# Patient Record
Sex: FEMALE | Race: WHITE | NOT HISPANIC OR LATINO | Employment: OTHER | URBAN - METROPOLITAN AREA
[De-identification: names, ages, dates, MRNs, and addresses within clinical notes are randomized per-mention and may not be internally consistent; named-entity substitution may affect disease eponyms.]

---

## 2018-01-16 ENCOUNTER — OFFICE VISIT (OUTPATIENT)
Dept: PODIATRY | Facility: CLINIC | Age: 78
End: 2018-01-16
Payer: MEDICARE

## 2018-01-16 ENCOUNTER — HOSPITAL ENCOUNTER (OUTPATIENT)
Dept: RADIOLOGY | Facility: HOSPITAL | Age: 78
Discharge: HOME | End: 2018-01-16
Attending: PODIATRIST
Payer: MEDICARE

## 2018-01-16 VITALS — WEIGHT: 156.75 LBS | SYSTOLIC BLOOD PRESSURE: 121 MMHG | DIASTOLIC BLOOD PRESSURE: 82 MMHG | HEART RATE: 77 BPM

## 2018-01-16 DIAGNOSIS — M79.672 BILATERAL FOOT PAIN: ICD-10-CM

## 2018-01-16 DIAGNOSIS — M79.671 BILATERAL FOOT PAIN: Primary | ICD-10-CM

## 2018-01-16 DIAGNOSIS — M72.2 PLANTAR FASCIITIS OF LEFT FOOT: ICD-10-CM

## 2018-01-16 DIAGNOSIS — M19.079 ARTHRITIS OF MIDFOOT: ICD-10-CM

## 2018-01-16 DIAGNOSIS — M79.672 ARCH PAIN OF LEFT FOOT: Primary | ICD-10-CM

## 2018-01-16 DIAGNOSIS — M21.6X1 ACQUIRED BILATERAL PES CAVUS: ICD-10-CM

## 2018-01-16 DIAGNOSIS — M21.6X2 ACQUIRED BILATERAL PES CAVUS: ICD-10-CM

## 2018-01-16 DIAGNOSIS — M79.671 BILATERAL FOOT PAIN: ICD-10-CM

## 2018-01-16 DIAGNOSIS — M79.672 BILATERAL FOOT PAIN: Primary | ICD-10-CM

## 2018-01-16 PROCEDURE — 99203 OFFICE O/P NEW LOW 30 MIN: CPT | Mod: S$PBB,,, | Performed by: PODIATRIST

## 2018-01-16 PROCEDURE — 99999 PR PBB SHADOW E&M-NEW PATIENT-LVL III: CPT | Mod: PBBFAC,,, | Performed by: PODIATRIST

## 2018-01-16 PROCEDURE — 73630 X-RAY EXAM OF FOOT: CPT | Mod: 26,50,, | Performed by: RADIOLOGY

## 2018-01-16 PROCEDURE — 73630 X-RAY EXAM OF FOOT: CPT | Mod: 50,TC,FY,PO

## 2018-01-16 PROCEDURE — 99203 OFFICE O/P NEW LOW 30 MIN: CPT | Mod: PBBFAC,25,PO | Performed by: PODIATRIST

## 2018-01-16 RX ORDER — ZINC GLUCONATE 50 MG
50 TABLET ORAL DAILY
COMMUNITY

## 2018-01-16 RX ORDER — ACETAMINOPHEN AND PHENYLEPHRINE HCL 325; 5 MG/1; MG/1
TABLET ORAL
COMMUNITY

## 2018-01-16 RX ORDER — CICLOPIROX 80 MG/ML
SOLUTION TOPICAL
Qty: 1 BOTTLE | Refills: 10 | Status: SHIPPED | OUTPATIENT
Start: 2018-01-16

## 2018-01-16 RX ORDER — ACETAMINOPHEN 500 MG
1 TABLET ORAL DAILY
COMMUNITY

## 2018-01-16 RX ORDER — SIMVASTATIN 20 MG/1
20 TABLET, FILM COATED ORAL NIGHTLY
COMMUNITY

## 2018-01-16 RX ORDER — MELOXICAM 7.5 MG/1
TABLET ORAL
Qty: 30 TABLET | Refills: 0 | Status: SHIPPED | OUTPATIENT
Start: 2018-01-16

## 2018-01-16 RX ORDER — VERAPAMIL HYDROCHLORIDE 180 MG/1
180 TABLET, FILM COATED, EXTENDED RELEASE ORAL NIGHTLY
COMMUNITY
End: 2018-01-25 | Stop reason: ALTCHOICE

## 2018-01-16 RX ORDER — MELATONIN 10 MG
CAPSULE ORAL
COMMUNITY

## 2018-01-16 NOTE — PROGRESS NOTES
PODIATRY CONSULTATION NOTE    CHIEF COMPLAINT  Chief Complaint   Patient presents with    Foot Pain     Bilateral foot pain. Left is worst than right. Throbbing arch pain that radiates. Present for over a year. Tried over the counter inserts with no relief       HPI  Sanjana West is a 77 y.o. female who complains of pain to both feet. She states pain is present at both arches and extends to top of her feet. She states LEFT is worse than RIGHT.  Pain has been present for several years.  Pain is worsening.  Pain is aggravated by weight bearing, walking.  Pain is improved by rest.  Previous treatments include: OTC inserts and Tylenol .      PMH  Past Medical History:   Diagnosis Date    Hyperlipidemia     Hypertension         PSH  History reviewed. No pertinent surgical history.     MEDS  No current outpatient prescriptions on file prior to visit.     No current facility-administered medications on file prior to visit.       Medication List with Changes/Refills   New Medications    CICLOPIROX (PENLAC) 8 % SOLN    Apply to affected toenails at night time DAILY. On 7th day, file nails down, clean all nails with alcohol and restart application process.    MELOXICAM (MOBIC) 7.5 MG TABLET    Take 1 pill daily for 14 days and then only take as needed for pain   Current Medications    BIOTIN 10,000 MCG CAP    Take by mouth.    CHOLECALCIFEROL, VITAMIN D3, (VITAMIN D3) 2,000 UNIT CAP    Take 1 capsule by mouth once daily.    CHROMIUM ORAL    Take 1,000 mg by mouth.    MELATONIN 10 MG CAP    Take by mouth.    POTASSIUM 99 MG TAB    Take by mouth once.    SIMVASTATIN (ZOCOR) 20 MG TABLET    Take 20 mg by mouth every evening.    TURMERIC ROOT EXTRACT ORAL    Take by mouth.    VERAPAMIL (CALAN-SR) 180 MG CR TABLET    Take 180 mg by mouth every evening.    ZINC GLUCONATE 50 MG TABLET    Take 50 mg by mouth once daily.       ALLG  Review of patient's allergies indicates:   Allergen Reactions    Sulfa (sulfonamide  antibiotics)        SOCIAL Hx  Social History     Social History    Marital status:      Spouse name: N/A    Number of children: N/A    Years of education: N/A     Social History Main Topics    Smoking status: Never Smoker    Smokeless tobacco: Never Used    Alcohol use None    Drug use: Unknown    Sexual activity: Not Asked     Other Topics Concern    None     Social History Narrative    None       FAM Hx  History reviewed. No pertinent family history.    Review of systems:  The patient denies nausea, vomiting, fevers, chills, shortness of breath, chest pain, and calf pain.      OBJECTIVE:  Vitals:    01/16/18 1417   BP: 121/82   Pulse: 77   Weight: 71.1 kg (156 lb 12 oz)   PainSc:   8   PainLoc: Foot       Lower Extremity Physical Exam    Vascular exam:   · Dorsalis pedis and posterior tibial pulses palpable 2/4 bilaterally.   · Capillary refill time immediate to the toes.   · Feet are warm to the touch.   · There are varicosities, telangiectasias noted to bilateral foot and ankle regions.   · There are no ecchymoses noted to bilateral foot and ankle regions.   · There is no gross lower extremity edema.    Dermatologic exam:   · Skin moist with healthy texture and turgor.  · There are no open ulcerations, lacerations, or fissures to bilateral foot and ankle regions. There are no signs of infection as there is no erythema, no proximal-extending lymphangiitis, no fluctuance, or crepitus noted on palpation to bilateral foot and ankle regions.   · There is no interdigital maceration.   · There are no hyperkeratotic lesions noted to feet. Nails are well-trimmed.    Neurologic exam:  ·  Epicritic sensation is intact as the patient is able to sense light touch to bilateral foot and ankle regions.     Musculoskeletal exam:   · There is pain with palpation medial band of plantar fascia, LEFT  · PES CAVUS foot type b/l   · No pain with medial-lateral compression of calcaneus.  · 5/5 muscle strength in all 4  quadrants.   · Ankle joint range of motion decreased with knee extended and flexed b/l.    Radiographic Evaluation: x-rays of the foot reviewed    ASSESSMENT:   Arch pain of left foot  -     Ambulatory Referral to Physical/Occupational Therapy    Plantar fasciitis of left foot    Arthritis of midfoot    Acquired bilateral pes cavus    Other orders  -     meloxicam (MOBIC) 7.5 MG tablet; Take 1 pill daily for 14 days and then only take as needed for pain  Dispense: 30 tablet; Refill: 0  -     ciclopirox (PENLAC) 8 % Soln; Apply to affected toenails at night time DAILY. On 7th day, file nails down, clean all nails with alcohol and restart application process.  Dispense: 1 Bottle; Refill: 10          PLAN:   1. The patient was examined and evaluated   2. Treatment options were discussed in detail; the patient elected to undergo conservative treatment. I Reviewed films with patient. Discussed different treatment options for heel pain.   3. I gave written and verbal instructions on heel cord stretching and this was demonstrated for the patient. A theraband was also provided to the patient for stretching exercises. Recommendations were given for plantar fasciitis treatment including icing, stretching, arch supports, avoidance of barefoot walking, appropriate shoe wear, and strict compliance. Discussed importance of patient to perform stretching exercises.   4. Prescription was written for OTC arch supports.  I Discussed that plantar fasciitis typically resolves on its own in a variable amount of time. If no improvement, will proceed with cortisone injection. I also  discussed possible tx with SLC, PT, Dynasplint and custom orthotics.Surgical intervention is the last resort for plantar fasciitis.     An Rx was written for MOBIC.    - A prescription for PENLAC was provided to patient for treatment of fungal toenail infection with written instructions on application. Refills were provided as a courtesy. Pt informed fungal  nail treatment topicals will take up to or over a year for improvement.     5. Information was given regarding the patient's condition and prognosis. All the patient's questions were answered.   6. Return to clinic in 4-6 weeks for follow up evaluation, or sooner if symptoms worsen.   7. The patient is welcome to call or email with any questions or concerns at any time.       Report Electronically Signed By:  Candelaria Pruitt DPM   Podiatric Medicine & Surgery  [facility]  1/16/2018

## 2018-01-16 NOTE — PATIENT INSTRUCTIONS
EUGENE OR PRISCILLA CRYSTAL    PENLAC ANTI-FUNGAL TOPICAL INSTRUCTIONS   1. You have been prescribed Penlac nail lacquer (Ciclopirox) topical solution 8%. Go and  the prescription from your local pharmacy.    2. Remove any nail polish on your feet. File away (with emery board) loose nail material and trim nails.    3. Apply the Penlac nail lacquer (ciclopirox) Topical Solution, 8% once daily (preferably at bedtime or eight hours before washing) to all affected nails with the applicator brush provided. The nail lacquer should be applied evenly over the entire nail plate. If possible, apply the solution to the nail bed, hyponychium, and the under surface of the nail plate when if your nail is loosely attached. Remember fungus lives under the nail plate, therefore the more the solution penetrates the nail bed, the better.    3. Continue to apply the solution daily (at bedtime preferably).  Daily applications should be made over the previous coat and removed with alcohol every seven days. This cycle should be repeated throughout the duration of therapy.     4. On the 7th day, remove the solution from all of your nails with alcohol. File your nails again with an emery board and then repeat the cycle again.    5. This treatment must be consistent. If you skip a day, continue the cycle as recommended. It may take up to 1 year for your nails to clear the fungal infection. Be patient.    It is recommended to dispose of all infected shoe gear that you will not likely wear again as well as weekly cleansing of all showering/bathing areas with antiseptics.Fungal spores like to hide in dark, warm, moist environments. Lastly, monthly treatments of all current shoe gear with moth balls x 8 hours.                  FUNGAL NAIL INFECTIONS    If your nail is thick and looks white or yellow, it may be infected with fungus. Nail   infections don't look pleasant, but they are not serious. There are treatments that   can clear up the  infection.     What is a fungal nail infection?   It's easy to catch a fungal nail infection, and lots of people get them. The sooner you   treat an infection, the easier it is to get rid of it. The fungi that cause these infections often live in warm, damp places, such as showers and floors around changing rooms.   People used to think there was nothing you could do about a fungal nail infection. But   there are now good treatments that can get rid of it. However, they take a long time to   work (as long as one year if the infection is bad). So you need to be patient.    Fungal infection of the nails causes changes in the appearance of fingernails and toenails. They may thicken, discolor, change shape or split. This condition is difficult to treat because nails grow slowly and have limited blood supply. It is common to have the infection come back after treatment.       What are the symptoms?   Your nail may look whitish or yellowish, and raised up from the finger or toe. The skin   around your nail may turn red. Sometimes the nail lifts off altogether. If the infection is   severe, the nail may also be crumbly. It's more common to get an infection of a toenail   than a fingernail. If you've had an infection for a long time, it may be painful. If you have a badly infected toe, it may be difficult to walk. If your immune system is weak or you have diabetes, you may be more likely to get these infections. The infection can also be more serious. So it's important to see your doctor as soon as possible if you think you have a nail infection.     What treatments work?   To get rid of a fungal nail infection you will probably have to take tablets, sometimes for   several months. There are also topical solutions (over the counter and prescription) that  you can put on your nail, and  things you can do to try to avoid getting another nail infection.    There are two types of medicines used.   Topical anti-fungal medicines  are applied to the surface of the skin and nail area. These medicines are not very effective because they cannot get deep into the nail.     Topical medicines are most useful in combination with oral medicines . Oral antifungal medicines are more effective because they penetrate the nail from the inside out.  If medicines fail, the nail can be removed surgically or chemically. This improves the effectiveness of medical treatment because the fungus is physically removed from the body.       Tablets   The tablets that doctors use most often are called itraconazole (brand name Sporanox)   and terbinafine (Lamisil). Terbinafine seems to work slightly better. If you take terbinafine every day for 12 weeks, there's a 6 in 10 chance you'll get rid of   your fungal toenail infection.      Alternative final options are:   If still no resolution with oral tablets or topics: then we can completely avulse/remove all involved toenails with subsequent treatment with topical antifungal solution.       Home Care:   1) Use medicines exactly as directed for as long as directed. Treating a fungal infection can take longer than other kinds of infections.   2) Smoking is a risk factor for fungal infection. This is one more reason to quit.   3) Wear absorbent socks and shoes that have ventilation. Sweaty feet increases risk of fungal infection and make an existing infection harder to treat.   4) Use footwear when in damp public places like swimming pools, gyms and shower rooms. This helps avoid exposure to the fungus that grows there.   It is recommended to dispose of all infected shoe gear that you will not likely wear again as well as weekly cleansing of all showering/bathing areas with antiseptics.Fungal spores like to hide in dark, warm, moist environments. Lastly, monthly treatments of all current shoe gear with moth balls x 8 hours.       Follow Up   with your doctor as directed by our staff.   Get Prompt Medical Attention   if any  of the following occur:   -- Skin alongside the nail becomes reddened, swollen, painful or drains pus   -- Side effects from oral anti-fungal medicines       © 3190-8173 [employer]. [address] [place] [address], [place] [address]. All rights reserved. This information is not intended as a substitute for professional medical care. Always follow your healthcare professional's instructions.

## 2018-01-25 ENCOUNTER — OFFICE VISIT (OUTPATIENT)
Dept: CARDIOLOGY | Facility: CLINIC | Age: 78
End: 2018-01-25
Payer: MEDICARE

## 2018-01-25 VITALS
BODY MASS INDEX: 32.22 KG/M2 | HEIGHT: 59 IN | HEART RATE: 74 BPM | SYSTOLIC BLOOD PRESSURE: 126 MMHG | WEIGHT: 159.81 LBS | DIASTOLIC BLOOD PRESSURE: 74 MMHG

## 2018-01-25 DIAGNOSIS — M79.89 LEG SWELLING: ICD-10-CM

## 2018-01-25 DIAGNOSIS — I10 ESSENTIAL HYPERTENSION: ICD-10-CM

## 2018-01-25 PROCEDURE — 99204 OFFICE O/P NEW MOD 45 MIN: CPT | Mod: S$PBB,,, | Performed by: INTERNAL MEDICINE

## 2018-01-25 PROCEDURE — 99213 OFFICE O/P EST LOW 20 MIN: CPT | Mod: PBBFAC,PO | Performed by: INTERNAL MEDICINE

## 2018-01-25 PROCEDURE — 99999 PR PBB SHADOW E&M-EST. PATIENT-LVL III: CPT | Mod: PBBFAC,,, | Performed by: INTERNAL MEDICINE

## 2018-01-25 RX ORDER — HYDROCHLOROTHIAZIDE 25 MG/1
25 TABLET ORAL DAILY
Qty: 30 TABLET | Refills: 11 | Status: SHIPPED | OUTPATIENT
Start: 2018-01-25 | End: 2019-01-27 | Stop reason: SDUPTHER

## 2018-01-25 NOTE — PROGRESS NOTES
Subjective:   Patient ID:  Sanjana West is a 78 y.o. female who presents for evaluation of Establish Care and Leg Swelling      79 yo female, came in for leg swelling. PCP is Dr. Harry at  clinic.  PMH HTn and HLD. Some OA.  Has been on Verapamil for a long time.  Left> right leg swelling for years, progressively worse, worse in PM but better in AM. No leg pain, numbness and weakness.  No CASTANEDA, chest pain, palpitation, orthopnea and PND.  Occasional dizziness. Regular gym exercise 3 times a week.  No smoking. Occasional drinking  ECHO in [facility] in : normaL EF, mild LVH and LAE, mild MR.         Past Medical History:   Diagnosis Date    Hyperlipidemia     Hypertension        History reviewed. No pertinent surgical history.    Social History   Substance Use Topics    Smoking status: Never Smoker    Smokeless tobacco: Never Used    Alcohol use Not on file       History reviewed. No pertinent family history.    Review of Systems   Constitution: Negative for decreased appetite, diaphoresis, fever, weakness, malaise/fatigue and night sweats.   HENT: Negative for nosebleeds.    Eyes: Negative for blurred vision and double vision.   Cardiovascular: Positive for leg swelling. Negative for chest pain, claudication, dyspnea on exertion, irregular heartbeat, near-syncope, orthopnea, palpitations, paroxysmal nocturnal dyspnea and syncope.   Respiratory: Negative for cough, shortness of breath, sleep disturbances due to breathing, snoring, sputum production and wheezing.    Endocrine: Negative for cold intolerance and polyuria.   Hematologic/Lymphatic: Does not bruise/bleed easily.   Skin: Negative for rash.   Musculoskeletal: Negative for back pain, falls, joint pain, joint swelling and neck pain.   Gastrointestinal: Negative for abdominal pain, heartburn, nausea and vomiting.   Genitourinary: Negative for dysuria, frequency and hematuria.   Neurological: Negative for difficulty with concentration,  dizziness, focal weakness, headaches, light-headedness, numbness and seizures.   Psychiatric/Behavioral: Negative for depression, memory loss and substance abuse. The patient does not have insomnia.    Allergic/Immunologic: Negative for HIV exposure and hives.       Objective:   Physical Exam   Constitutional: She is oriented to person, place, and time. She appears well-nourished.   HENT:   Head: Normocephalic.   Eyes: Pupils are equal, round, and reactive to light.   Neck: Normal carotid pulses and no JVD present. Carotid bruit is not present. No thyromegaly present.   Cardiovascular: Normal rate, regular rhythm, normal heart sounds and normal pulses.   No extrasystoles are present. PMI is not displaced.  Exam reveals no gallop and no S3.    No murmur heard.  Pulmonary/Chest: Breath sounds normal. No stridor. No respiratory distress.   Abdominal: Soft. Bowel sounds are normal. There is no tenderness. There is no rebound.   Musculoskeletal: Normal range of motion. She exhibits edema.   1+ LLE  trace RLL   Neurological: She is alert and oriented to person, place, and time.   Skin: Skin is intact. No rash noted.   Psychiatric: Her behavior is normal.       No results found for: CHOL  No results found for: HDL  No results found for: LDLCALC  No results found for: TRIG  No results found for: CHOLHDL    Chemistry    No results found for: NA, K, CL, CO2, BUN, CREATININE, GLU No results found for: CALCIUM, ALKPHOS, AST, ALT, BILITOT, ESTGFRAFRICA, EGFRNONAA       No results found for: TSH  No results found for: INR, PROTIME  No results found for: WBC, HGB, HCT, MCV, PLT  BNP  @LABRCNTIP(BNP,BNPTRIAGEBLO)@  CrCl cannot be calculated (No order found.).     Assessment:      1. Essential hypertension    2. Leg swelling        Plan:   D/c cardizem  Add HCTZ for HTN and leg swelling,  DASH

## 2018-01-26 ENCOUNTER — CLINICAL SUPPORT (OUTPATIENT)
Dept: REHABILITATION | Facility: HOSPITAL | Age: 78
End: 2018-01-26
Attending: PODIATRIST
Payer: MEDICARE

## 2018-01-26 DIAGNOSIS — M79.672 LEFT FOOT PAIN: Primary | ICD-10-CM

## 2018-01-26 PROCEDURE — 97161 PT EVAL LOW COMPLEX 20 MIN: CPT | Mod: PO | Performed by: PHYSICAL THERAPIST

## 2018-01-26 PROCEDURE — G8979 MOBILITY GOAL STATUS: HCPCS | Mod: CI,PO | Performed by: PHYSICAL THERAPIST

## 2018-01-26 PROCEDURE — G8978 MOBILITY CURRENT STATUS: HCPCS | Mod: CK,PO | Performed by: PHYSICAL THERAPIST

## 2018-01-26 PROCEDURE — 97140 MANUAL THERAPY 1/> REGIONS: CPT | Mod: PO | Performed by: PHYSICAL THERAPIST

## 2018-01-26 NOTE — PROGRESS NOTES
DATE OF INITIAL PHYSICAL THERAPY EVALUATION:             2018      REFERRING PROVIDER:       Candelaria Le DPM      REFERRING DIAGNOSIS:     Left foot pain   M79.62       ORDERS:   Evaluate and treat as indicated 1 x week x 60 days      Orders will  2018    CODING CYCLE VISIT    #1      SUBJECTIVE:      Patients primary complaint(s):  Tenderness and pain of the plantar fascia bilaterally; left worse than right  Painful gait    History of present condition:    Patient reports she began to experience tenderness and pain in the arches of both feet in 2017.  The left arch has been significantly more painful than the right.  She denies any trauma or unusual activity prior to onset of symptoms.  Prior to becoming symptomatic she was walking regularly on the treadmill for exercise.  She is no longer able to tolerate the treadmill; but has been able to continue exercising on the elliptical.   She states her pain wakes her during the night.  Pain is located in the plantar fascia bilaterally and described as throbbing in nature.  The first steps in the early AM are particularly painful.   She reports trying to stretch as suggested by her doctor; however she has tolerated these stretches poorly stating they are painful.  Stretch demonstrated to be ankle dorsiflexion combined with toe extension, and knee fully extended.    Pain Ratin/10  Left plantar fascia      4/10  Right plantar fasica    Patient reports the following functional activity limitations:  Gait impaired due to arch pain with weight bearing.    (FUNCTIONAL ASSESSMENT TOOL)  LOWER EXTREMITY FUNCTIONAL SCALE    Patient-reported functional assessment scores are rated as follows:  A score of 0/4 represents extreme difficulty or unable to perform the activity. A score of 1/4 represents quite a bit of difficulty. A score of 2/4 represents moderate difficulty. A score of 3/4 represents a little bit of difficulty. A score  of 4/4 represents no difficulty.  Completed on January 26, 2018                EVAL   1. Any of your usual work, housework or school activities   2/4  2. Your usual hobbies, sporting     1/4  3. Getting in and out of tub      3/4  4. Walking between rooms      3/4  5. Putting on shoes or socks      3/4  6. Squatting        4/4  7. Lifting an object from the ground      4/4  8. Performing light activities around the home   2/4  9. Performing heavy activities around the home   2/4  10. Getting in and out of car      4/4  11. Walking 2 blocks       1/4  12.Walking a mile       1/4  13. Getting up and down 1 flight of stairs    2/4  14. Standing for 1 hour      3/4  15. Sitting for an hour       4/4  16. Running on even ground      0/4  17. Running on uneven ground     0/4  18. Making sharp turns when running fast    0/4  19. Hopping        0/4  20. Rolling over in bed       4/4    Patient reports 55% ability based on score of the Lower Extremity Functional Scale.      Functional Limitations and Goal:  This patient's primary Physical Therapy goal is to return to their prior level of function (Mobility G-8978) without limitations.  The patient's current level of impairment is 40 to 59  percent impaired (CK) based on their score of 55 percent impaired on the Lower Extremity Functional Scale.  The patient is expected to achieve a score of 1 to 19 percent impaired ( G-8979  CI ) within 10 treatment days.    Past Medical History and co-morbidities:  Past Medical History:   Diagnosis Date    Hyperlipidemia     Hypertension      Patient Active Problem List   Diagnosis    Essential hypertension    Leg swelling       Current Outpatient Prescriptions:     biotin 10,000 mcg Cap, Take by mouth., Disp: , Rfl:     cholecalciferol, vitamin D3, (VITAMIN D3) 2,000 unit Cap, Take 1 capsule by mouth once daily., Disp: , Rfl:     CHROMIUM ORAL, Take 1,000 mg by mouth., Disp: , Rfl:     ciclopirox (PENLAC) 8 % Soln, Apply to  affected toenails at night time DAILY. On 7th day, file nails down, clean all nails with alcohol and restart application process., Disp: 1 Bottle, Rfl: 10    hydroCHLOROthiazide (HYDRODIURIL) 25 MG tablet, Take 1 tablet (25 mg total) by mouth once daily., Disp: 30 tablet, Rfl: 11    melatonin 10 mg Cap, Take by mouth., Disp: , Rfl:     meloxicam (MOBIC) 7.5 MG tablet, Take 1 pill daily for 14 days and then only take as needed for pain, Disp: 30 tablet, Rfl: 0    potassium 99 mg Tab, Take by mouth once., Disp: , Rfl:     simvastatin (ZOCOR) 20 MG tablet, Take 20 mg by mouth every evening., Disp: , Rfl:     TURMERIC ROOT EXTRACT ORAL, Take by mouth., Disp: , Rfl:     zinc gluconate 50 mg tablet, Take 50 mg by mouth once daily., Disp: , Rfl:     Previous physical therapy and treatments:  Patient has not participated in a physical therapy program to date for current referring diagnoses.    Occupational/psychosocial/educational profile:  retired    OBJECTIVE:    Musculoskeletal Exam:    Postural Exam  Left foot is in pes planus    Swelling  No swelling noted in the ankle, foot, or digits bilaterally    ROM  Ankle ROM is WNL's bilaterally    Flexibility  Decreased flexibility of the soleus noted bilaterally    Functional Strength Testing  Ankle musculature 5/5 bilaterally     Palpation    Left foot:   Acutely tender along the medial distribution of the plantar fascia and at its calcaneal attachment.    Right foot:  Moderately tender along the medial distribution of the plantar fascia and its calcaneal attachment.      Neuromuscular Exam    Gait  Antalgic with weight bearing on the left foot.    Transitional Movements  Sit to stand painful with weight bearing on the left foot    Balance and Coordination  No balance or coordination difficulties observed    Sensation  No sensory disturbances noted throughout the LE's  Patient did not complain of paresthesias.    PROBLEM LIST - ASSESSMENT  Patient presents with acute  tenderness of the left plantar fascia which is inhibiting ambulation, ADL's and exercise activities due to pain with weight bearing.  She has tolerated the stretching exercises given to her by the podiatrist poorly.  Recommend modifying the stretches, and begin MFR of the plantar fascia.    Activity Limitations, Participation Restrictions, and CO-MORBIDITIES which may impact the plan of care and potentially impede the patient's progress in therapy include:  none    The patient does not present with any learning or communication barriers which may impact the plan of care and potentially impede the patient's progress in therapy.      CLINICAL PRESENTATION:  Patient's Clinical Presentation is STABLE.        RECOMMENDED PLAN OF CARE:  Gastrocs/soleus stretching, NWB without combined toe extension.  Tool assisted MFR of the plantar fascia  Joint mobilization of the ankle and forefoot    TREATMENT PROVIDED:     -moist heat applied to the left plantar fascia x 20 mins  -tool assisted MFR of the left plantar fascia  -joint mobilization of the hindfoot and forefoot of the left ankle  -passive stretching of the left plantar fascia  (manual therapy x 20 mins)      PLAN:   Plan of Care forwarded to Dr. Le for approval to include treatment of right plantar fascia tenderness and to increase therapy visits to 2 x week as requested by the patient.      SHORT TERM GOALS:    1. Patient will report a decrease in foot pain of 2 levels or greater  2. Patient will report compliance with daily stretches  3. Acquire approval to treat complaints of right plantar fascia tenderness through signed Plan of Care    LONG TERM GOALS:  1. Resolution of plantar fascia tenderness  2. Patient to return to treadmill walking for exercise  3. Patient will report a 20% or greater decrease in functional impairment on the LEFS      These services are reasonable and necessary for the conditions set forth above while under my care.   '

## 2018-01-26 NOTE — PLAN OF CARE
DATE OF INITIAL PHYSICAL THERAPY EVALUATION:             2018      REFERRING PROVIDER:       Candelaria Le DPM      REFERRING DIAGNOSIS:     Left foot pain   M79.62       ORDERS:   Evaluate and treat as indicated 1 x week x 60 days      Orders will  2018    CODING CYCLE VISIT    #1      SUBJECTIVE:      Patients primary complaint(s):  Tenderness and pain of the plantar fascia bilaterally; left worse than right  Painful gait    History of present condition:    Patient reports she began to experience tenderness and pain in the arches of both feet in 2017.  The left arch has been significantly more painful than the right.  She denies any trauma or unusual activity prior to onset of symptoms.  Prior to becoming symptomatic she was walking regularly on the treadmill for exercise.  She is no longer able to tolerate the treadmill; but has been able to continue exercising on the elliptical.   She states her pain wakes her during the night.  Pain is located in the plantar fascia bilaterally and described as throbbing in nature.  The first steps in the early AM are particularly painful.   She reports trying to stretch as suggested by her doctor; however she has tolerated these stretches poorly stating they are painful.  Stretch demonstrated to be ankle dorsiflexion combined with toe extension, and knee fully extended.    Pain Ratin/10  Left plantar fascia      4/10  Right plantar fasica    Patient reports the following functional activity limitations:  Gait impaired due to arch pain with weight bearing.    (FUNCTIONAL ASSESSMENT TOOL)  LOWER EXTREMITY FUNCTIONAL SCALE    Patient-reported functional assessment scores are rated as follows:  A score of 0/4 represents extreme difficulty or unable to perform the activity. A score of 1/4 represents quite a bit of difficulty. A score of 2/4 represents moderate difficulty. A score of 3/4 represents a little bit of difficulty. A score  of 4/4 represents no difficulty.  Completed on January 26, 2018                EVAL   1. Any of your usual work, housework or school activities   2/4  2. Your usual hobbies, sporting     1/4  3. Getting in and out of tub      3/4  4. Walking between rooms      3/4  5. Putting on shoes or socks      3/4  6. Squatting        4/4  7. Lifting an object from the ground      4/4  8. Performing light activities around the home   2/4  9. Performing heavy activities around the home   2/4  10. Getting in and out of car      4/4  11. Walking 2 blocks       1/4  12.Walking a mile       1/4  13. Getting up and down 1 flight of stairs    2/4  14. Standing for 1 hour      3/4  15. Sitting for an hour       4/4  16. Running on even ground      0/4  17. Running on uneven ground     0/4  18. Making sharp turns when running fast    0/4  19. Hopping        0/4  20. Rolling over in bed       4/4    Patient reports 55% ability based on score of the Lower Extremity Functional Scale.      Functional Limitations and Goal:  This patient's primary Physical Therapy goal is to return to their prior level of function (Mobility G-8978) without limitations.  The patient's current level of impairment is 40 to 59  percent impaired (CK) based on their score of 55 percent impaired on the Lower Extremity Functional Scale.  The patient is expected to achieve a score of 1 to 19 percent impaired ( G-8979  CI ) within 10 treatment days.    Past Medical History and co-morbidities:  Past Medical History:   Diagnosis Date    Hyperlipidemia     Hypertension      Patient Active Problem List   Diagnosis    Essential hypertension    Leg swelling       Current Outpatient Prescriptions:     biotin 10,000 mcg Cap, Take by mouth., Disp: , Rfl:     cholecalciferol, vitamin D3, (VITAMIN D3) 2,000 unit Cap, Take 1 capsule by mouth once daily., Disp: , Rfl:     CHROMIUM ORAL, Take 1,000 mg by mouth., Disp: , Rfl:     ciclopirox (PENLAC) 8 % Soln, Apply to  affected toenails at night time DAILY. On 7th day, file nails down, clean all nails with alcohol and restart application process., Disp: 1 Bottle, Rfl: 10    hydroCHLOROthiazide (HYDRODIURIL) 25 MG tablet, Take 1 tablet (25 mg total) by mouth once daily., Disp: 30 tablet, Rfl: 11    melatonin 10 mg Cap, Take by mouth., Disp: , Rfl:     meloxicam (MOBIC) 7.5 MG tablet, Take 1 pill daily for 14 days and then only take as needed for pain, Disp: 30 tablet, Rfl: 0    potassium 99 mg Tab, Take by mouth once., Disp: , Rfl:     simvastatin (ZOCOR) 20 MG tablet, Take 20 mg by mouth every evening., Disp: , Rfl:     TURMERIC ROOT EXTRACT ORAL, Take by mouth., Disp: , Rfl:     zinc gluconate 50 mg tablet, Take 50 mg by mouth once daily., Disp: , Rfl:     Previous physical therapy and treatments:  Patient has not participated in a physical therapy program to date for current referring diagnoses.    Occupational/psychosocial/educational profile:  retired    OBJECTIVE:    Musculoskeletal Exam:    Postural Exam  Left foot is in pes planus    Swelling  No swelling noted in the ankle, foot, or digits bilaterally    ROM  Ankle ROM is WNL's bilaterally    Flexibility  Decreased flexibility of the soleus noted bilaterally    Functional Strength Testing  Ankle musculature 5/5 bilaterally     Palpation    Left foot:   Acutely tender along the medial distribution of the plantar fascia and at its calcaneal attachment.    Right foot:  Moderately tender along the medial distribution of the plantar fascia and its calcaneal attachment.      Neuromuscular Exam    Gait  Antalgic with weight bearing on the left foot.    Transitional Movements  Sit to stand painful with weight bearing on the left foot    Balance and Coordination  No balance or coordination difficulties observed    Sensation  No sensory disturbances noted throughout the LE's  Patient did not complain of paresthesias.    PROBLEM LIST - ASSESSMENT  Patient presents with acute  tenderness of the left plantar fascia which is inhibiting ambulation, ADL's and exercise activities due to pain with weight bearing.  She has tolerated the stretching exercises given to her by the podiatrist poorly.  Recommend modifying the stretches, and begin MFR of the plantar fascia.    Activity Limitations, Participation Restrictions, and CO-MORBIDITIES which may impact the plan of care and potentially impede the patient's progress in therapy include:  none    The patient does not present with any learning or communication barriers which may impact the plan of care and potentially impede the patient's progress in therapy.      CLINICAL PRESENTATION:  Patient's Clinical Presentation is STABLE.        RECOMMENDED PLAN OF CARE:  Gastrocs/soleus stretching, NWB without combined toe extension.  Tool assisted MFR of the plantar fascia  Joint mobilization of the ankle and forefoot    TREATMENT PROVIDED:     -moist heat applied to the left plantar fascia x 20 mins  -tool assisted MFR of the left plantar fascia  -joint mobilization of the hindfoot and forefoot of the left ankle  -passive stretching of the left plantar fascia  (manual therapy x 20 mins)      PLAN:   Plan of Care forwarded to Dr. Le for approval to include treatment of right plantar fascia tenderness and to increase therapy visits to 2 x week as requested by the patient.      SHORT TERM GOALS:    1. Patient will report a decrease in foot pain of 2 levels or greater  2. Patient will report compliance with daily stretches  3. Acquire approval to treat complaints of right plantar fascia tenderness through signed Plan of Care    LONG TERM GOALS:  1. Resolution of plantar fascia tenderness  2. Patient to return to treadmill walking for exercise  3. Patient will report a 20% or greater decrease in functional impairment on the LEFS      These services are reasonable and necessary for the conditions set forth above while under my care.

## 2018-01-29 ENCOUNTER — TELEPHONE (OUTPATIENT)
Dept: CARDIOLOGY | Facility: CLINIC | Age: 78
End: 2018-01-29

## 2018-01-29 DIAGNOSIS — I10 ESSENTIAL HYPERTENSION: Primary | ICD-10-CM

## 2018-01-29 RX ORDER — LISINOPRIL 10 MG/1
10 TABLET ORAL DAILY
Qty: 90 TABLET | Refills: 3 | Status: SHIPPED | OUTPATIENT
Start: 2018-01-29 | End: 2018-02-12 | Stop reason: SDUPTHER

## 2018-01-29 NOTE — TELEPHONE ENCOUNTER
Patient called and stated blood pressure is still high, even with new medication HCTZ .    Morning blood pressure 179/106, 181/107    SBP always above 169.    Please advise.

## 2018-01-29 NOTE — TELEPHONE ENCOUNTER
----- Message from Jyoti Grant sent at 1/29/2018  9:26 AM CST -----  Contact: [phone]   Pt would like to consult with the nurse about Rx medication and blood pressure. Please call back at [phone]. [facility]

## 2018-01-29 NOTE — TELEPHONE ENCOUNTER
Patient notified of new medication Lisinopril 10mg by mouth daily and labs.    Patient repeated instruction back correctly.

## 2018-02-02 ENCOUNTER — CLINICAL SUPPORT (OUTPATIENT)
Dept: REHABILITATION | Facility: HOSPITAL | Age: 78
End: 2018-02-02
Attending: PODIATRIST
Payer: MEDICARE

## 2018-02-02 ENCOUNTER — LAB VISIT (OUTPATIENT)
Dept: LAB | Facility: HOSPITAL | Age: 78
End: 2018-02-02
Attending: INTERNAL MEDICINE
Payer: MEDICARE

## 2018-02-02 DIAGNOSIS — M79.671 RIGHT FOOT PAIN: ICD-10-CM

## 2018-02-02 DIAGNOSIS — M79.672 LEFT FOOT PAIN: Primary | ICD-10-CM

## 2018-02-02 DIAGNOSIS — I10 ESSENTIAL HYPERTENSION: ICD-10-CM

## 2018-02-02 LAB
ANION GAP SERPL CALC-SCNC: 11 MMOL/L
BUN SERPL-MCNC: 27 MG/DL
CALCIUM SERPL-MCNC: 9.3 MG/DL
CHLORIDE SERPL-SCNC: 103 MMOL/L
CO2 SERPL-SCNC: 27 MMOL/L
CREAT SERPL-MCNC: 0.8 MG/DL
EST. GFR  (AFRICAN AMERICAN): >60 ML/MIN/1.73 M^2
EST. GFR  (NON AFRICAN AMERICAN): >60 ML/MIN/1.73 M^2
GLUCOSE SERPL-MCNC: 122 MG/DL
POTASSIUM SERPL-SCNC: 3.7 MMOL/L
SODIUM SERPL-SCNC: 141 MMOL/L

## 2018-02-02 PROCEDURE — 80048 BASIC METABOLIC PNL TOTAL CA: CPT

## 2018-02-02 PROCEDURE — 97140 MANUAL THERAPY 1/> REGIONS: CPT | Mod: PO | Performed by: PHYSICAL THERAPIST

## 2018-02-02 PROCEDURE — 36415 COLL VENOUS BLD VENIPUNCTURE: CPT | Mod: PO

## 2018-02-02 NOTE — PROGRESS NOTES
DATE OF INITIAL PHYSICAL THERAPY EVALUATION:             2018      REFERRING PROVIDER:       Candelaria Le DPM      REFERRING DIAGNOSIS:     Left foot pain   M79.62         Right foot pain M79.61    ORDERS:   Evaluate and treat as indicated; Plan of Care sign by Dr. Le approving treatment 2 x week and to include the right foot.      Orders will  2018    CODING CYCLE VISIT    #2      SUBJECTIVE:  Patient states she is much better; experiencing less tenderness and pain when walking.    Patients primary complaint(s):  Tenderness and pain of the plantar fascia bilaterally; left worse than right  Uncomfortable gait    History of present condition:  (From initial eval)  Patient reports she began to experience tenderness and pain in the arches of both feet in 2017.  The left arch has been significantly more painful than the right.  She denies any trauma or unusual activity prior to onset of symptoms.  Prior to becoming symptomatic she was walking regularly on the treadmill for exercise.  She is no longer able to tolerate the treadmill; but has been able to continue exercising on the elliptical.   She states her pain wakes her during the night.  Pain is located in the plantar fascia bilaterally and described as throbbing in nature.  The first steps in the early AM are particularly painful.   She reports trying to stretch as suggested by her doctor; however she has tolerated these stretches poorly stating they are painful.  Stretch demonstrated to be ankle dorsiflexion combined with toe extension, and knee fully extended.    Pain Ratin/10  Left plantar fascia      2/10  Right plantar fasica    Patient reports the following functional activity limitations:  Gait impaired due to arch pain with weight bearing.    (FUNCTIONAL ASSESSMENT TOOL)  LOWER EXTREMITY FUNCTIONAL SCALE    Patient-reported functional assessment scores are rated as follows:  A score of 0/4 represents  extreme difficulty or unable to perform the activity. A score of 1/4 represents quite a bit of difficulty. A score of 2/4 represents moderate difficulty. A score of 3/4 represents a little bit of difficulty. A score of 4/4 represents no difficulty.  Completed on January 26, 2018                EVAL   1. Any of your usual work, housework or school activities   2/4  2. Your usual hobbies, sporting     1/4  3. Getting in and out of tub      3/4  4. Walking between rooms      3/4  5. Putting on shoes or socks      3/4  6. Squatting        4/4  7. Lifting an object from the ground      4/4  8. Performing light activities around the home   2/4  9. Performing heavy activities around the home   2/4  10. Getting in and out of car      4/4  11. Walking 2 blocks       1/4  12.Walking a mile       1/4  13. Getting up and down 1 flight of stairs    2/4  14. Standing for 1 hour      3/4  15. Sitting for an hour       4/4  16. Running on even ground      0/4  17. Running on uneven ground     0/4  18. Making sharp turns when running fast    0/4  19. Hopping        0/4  20. Rolling over in bed       4/4    Patient reports 55% ability based on score of the Lower Extremity Functional Scale.      Functional Limitations and Goal:  This patient's primary Physical Therapy goal is to return to their prior level of function (Mobility G-8978) without limitations.  The patient's current level of impairment is 40 to 59  percent impaired (CK) based on their score of 55 percent impaired on the Lower Extremity Functional Scale.  The patient is expected to achieve a score of 1 to 19 percent impaired ( G-8979  CI ) within 10 treatment days.    Past Medical History and co-morbidities:  Past Medical History:   Diagnosis Date    Hyperlipidemia     Hypertension      Patient Active Problem List   Diagnosis    Essential hypertension    Leg swelling       Current Outpatient Prescriptions:     biotin 10,000 mcg Cap, Take by mouth., Disp: , Rfl:      cholecalciferol, vitamin D3, (VITAMIN D3) 2,000 unit Cap, Take 1 capsule by mouth once daily., Disp: , Rfl:     CHROMIUM ORAL, Take 1,000 mg by mouth., Disp: , Rfl:     ciclopirox (PENLAC) 8 % Soln, Apply to affected toenails at night time DAILY. On 7th day, file nails down, clean all nails with alcohol and restart application process., Disp: 1 Bottle, Rfl: 10    hydroCHLOROthiazide (HYDRODIURIL) 25 MG tablet, Take 1 tablet (25 mg total) by mouth once daily., Disp: 30 tablet, Rfl: 11    lisinopril 10 MG tablet, Take 1 tablet (10 mg total) by mouth once daily., Disp: 90 tablet, Rfl: 3    melatonin 10 mg Cap, Take by mouth., Disp: , Rfl:     meloxicam (MOBIC) 7.5 MG tablet, Take 1 pill daily for 14 days and then only take as needed for pain, Disp: 30 tablet, Rfl: 0    potassium 99 mg Tab, Take by mouth once., Disp: , Rfl:     simvastatin (ZOCOR) 20 MG tablet, Take 20 mg by mouth every evening., Disp: , Rfl:     TURMERIC ROOT EXTRACT ORAL, Take by mouth., Disp: , Rfl:     zinc gluconate 50 mg tablet, Take 50 mg by mouth once daily., Disp: , Rfl:     Previous physical therapy and treatments:  Patient has not participated in a physical therapy program to date for current referring diagnoses.    Occupational/psychosocial/educational profile:  retired    OBJECTIVE:    Musculoskeletal Exam:  From initial Evaluation    Postural Exam  Left foot is in pes planus    Swelling  No swelling noted in the ankle, foot, or digits bilaterally    ROM  Ankle ROM is WNL's bilaterally    Flexibility  Decreased flexibility of the soleus noted bilaterally    Functional Strength Testing  Ankle musculature 5/5 bilaterally     Palpation    Left foot:   Acutely tender along the medial distribution of the plantar fascia and at its calcaneal attachment.    Right foot:  Moderately tender along the medial distribution of the plantar fascia and its calcaneal attachment.      Neuromuscular Exam    Gait  Antalgic with weight bearing on the  left foot.    Transitional Movements  Sit to stand painful with weight bearing on the left foot    Balance and Coordination  No balance or coordination difficulties observed    Sensation  No sensory disturbances noted throughout the LE's  Patient did not complain of paresthesias.    PROBLEM LIST - ASSESSMENT  Patient presents with acute tenderness of the left plantar fascia which is inhibiting ambulation, ADL's and exercise activities due to pain with weight bearing.  She has tolerated the stretching exercises given to her by the podiatrist poorly.  Recommend modifying the stretches, and begin MFR of the plantar fascia.    Activity Limitations, Participation Restrictions, and CO-MORBIDITIES which may impact the plan of care and potentially impede the patient's progress in therapy include:  none    The patient does not present with any learning or communication barriers which may impact the plan of care and potentially impede the patient's progress in therapy.      CLINICAL PRESENTATION:  Patient's Clinical Presentation is STABLE.        RECOMMENDED PLAN OF CARE:  Gastrocs/soleus stretching, NWB without combined toe extension.  Tool assisted MFR of the plantar fascia  Joint mobilization of the ankle and forefoot    TREATMENT PROVIDED:     -moist heat applied to the left plantar fascia x 20 mins  -tool assisted MFR of the plantar fascia bilaterally   -joint mobilization of the hindfoot and forefoot of the ankle bilaterally  -passive stretching of the plantar fascia bilaterally  (manual therapy x 25 mins)      PLAN:   Continue therapy 2 x week.      SHORT TERM GOALS:    1. Patient will report a decrease in foot pain of 2 levels or greater - goal achieved  2. Patient will report compliance with daily stretches - goal achieved  3. Acquire approval to treat complaints of right plantar fascia tenderness through signed Plan of Care - goal achieved     LONG TERM GOALS:  1. Resolution of plantar fascia tenderness  2. Patient  to return to treadmill walking for exercise  3. Patient will report a 20% or greater decrease in functional impairment on the LEFS      These services are reasonable and necessary for the conditions set forth above while under my care.   '

## 2018-02-06 ENCOUNTER — TELEPHONE (OUTPATIENT)
Dept: CARDIOLOGY | Facility: CLINIC | Age: 78
End: 2018-02-06

## 2018-02-06 ENCOUNTER — CLINICAL SUPPORT (OUTPATIENT)
Dept: REHABILITATION | Facility: HOSPITAL | Age: 78
End: 2018-02-06
Attending: PODIATRIST
Payer: MEDICARE

## 2018-02-06 DIAGNOSIS — M79.672 LEFT FOOT PAIN: Primary | ICD-10-CM

## 2018-02-06 DIAGNOSIS — M79.671 RIGHT FOOT PAIN: ICD-10-CM

## 2018-02-06 PROCEDURE — 97140 MANUAL THERAPY 1/> REGIONS: CPT | Mod: PO | Performed by: PHYSICAL THERAPIST

## 2018-02-06 NOTE — PROGRESS NOTES
DATE OF INITIAL PHYSICAL THERAPY EVALUATION:             2018      REFERRING PROVIDER:       Candelaria Le DPM      REFERRING DIAGNOSIS:     Left foot pain   M79.62         Right foot pain M79.61    ORDERS:   Evaluate and treat as indicated; Plan of Care sign by Dr. Le approving treatment 2 x week and to include the right foot.      Orders will  2018    CODING CYCLE VISIT    #3      SUBJECTIVE:  Patient states she was less symptomatic for several days following her last appointment.  She complains of soreness and tenderness of the plantar fascia bilaterally.    Patients primary complaint(s):  Tenderness and pain of the plantar fascia bilaterally; left worse than right  Uncomfortable gait    History of present condition:  (From initial eval)  Patient reports she began to experience tenderness and pain in the arches of both feet in 2017.  The left arch has been significantly more painful than the right.  She denies any trauma or unusual activity prior to onset of symptoms.  Prior to becoming symptomatic she was walking regularly on the treadmill for exercise.  She is no longer able to tolerate the treadmill; but has been able to continue exercising on the elliptical.   She states her pain wakes her during the night.  Pain is located in the plantar fascia bilaterally and described as throbbing in nature.  The first steps in the early AM are particularly painful.   She reports trying to stretch as suggested by her doctor; however she has tolerated these stretches poorly stating they are painful.  Stretch demonstrated to be ankle dorsiflexion combined with toe extension, and knee fully extended.    Pain Ratin/10  Left plantar fascia      2/10  Right plantar fasica    Patient reports the following functional activity limitations:  Gait impaired due to arch pain with weight bearing.    (FUNCTIONAL ASSESSMENT TOOL)  LOWER EXTREMITY FUNCTIONAL SCALE    Patient-reported  functional assessment scores are rated as follows:  A score of 0/4 represents extreme difficulty or unable to perform the activity. A score of 1/4 represents quite a bit of difficulty. A score of 2/4 represents moderate difficulty. A score of 3/4 represents a little bit of difficulty. A score of 4/4 represents no difficulty.  Completed on January 26, 2018                EVAL   1. Any of your usual work, housework or school activities   2/4  2. Your usual hobbies, sporting     1/4  3. Getting in and out of tub      3/4  4. Walking between rooms      3/4  5. Putting on shoes or socks      3/4  6. Squatting        4/4  7. Lifting an object from the ground      4/4  8. Performing light activities around the home   2/4  9. Performing heavy activities around the home   2/4  10. Getting in and out of car      4/4  11. Walking 2 blocks       1/4  12.Walking a mile       1/4  13. Getting up and down 1 flight of stairs    2/4  14. Standing for 1 hour      3/4  15. Sitting for an hour       4/4  16. Running on even ground      0/4  17. Running on uneven ground     0/4  18. Making sharp turns when running fast    0/4  19. Hopping        0/4  20. Rolling over in bed       4/4    Patient reports 55% ability based on score of the Lower Extremity Functional Scale.      Functional Limitations and Goal:  This patient's primary Physical Therapy goal is to return to their prior level of function (Mobility G-8978) without limitations.  The patient's current level of impairment is 40 to 59  percent impaired (CK) based on their score of 55 percent impaired on the Lower Extremity Functional Scale.  The patient is expected to achieve a score of 1 to 19 percent impaired ( G-8979  CI ) within 10 treatment days.    Past Medical History and co-morbidities:  Past Medical History:   Diagnosis Date    Hyperlipidemia     Hypertension      Patient Active Problem List   Diagnosis    Essential hypertension    Leg swelling       Current Outpatient  Prescriptions:     biotin 10,000 mcg Cap, Take by mouth., Disp: , Rfl:     cholecalciferol, vitamin D3, (VITAMIN D3) 2,000 unit Cap, Take 1 capsule by mouth once daily., Disp: , Rfl:     CHROMIUM ORAL, Take 1,000 mg by mouth., Disp: , Rfl:     ciclopirox (PENLAC) 8 % Soln, Apply to affected toenails at night time DAILY. On 7th day, file nails down, clean all nails with alcohol and restart application process., Disp: 1 Bottle, Rfl: 10    hydroCHLOROthiazide (HYDRODIURIL) 25 MG tablet, Take 1 tablet (25 mg total) by mouth once daily., Disp: 30 tablet, Rfl: 11    lisinopril 10 MG tablet, Take 1 tablet (10 mg total) by mouth once daily., Disp: 90 tablet, Rfl: 3    melatonin 10 mg Cap, Take by mouth., Disp: , Rfl:     meloxicam (MOBIC) 7.5 MG tablet, Take 1 pill daily for 14 days and then only take as needed for pain, Disp: 30 tablet, Rfl: 0    potassium 99 mg Tab, Take by mouth once., Disp: , Rfl:     simvastatin (ZOCOR) 20 MG tablet, Take 20 mg by mouth every evening., Disp: , Rfl:     TURMERIC ROOT EXTRACT ORAL, Take by mouth., Disp: , Rfl:     zinc gluconate 50 mg tablet, Take 50 mg by mouth once daily., Disp: , Rfl:     Previous physical therapy and treatments:  Patient has not participated in a physical therapy program to date for current referring diagnoses.    Occupational/psychosocial/educational profile:  retired    OBJECTIVE:    Musculoskeletal Exam:  From initial Evaluation    Postural Exam  Left foot is in pes planus    Swelling  No swelling noted in the ankle, foot, or digits bilaterally    ROM  Ankle ROM is WNL's bilaterally    Flexibility  Decreased flexibility of the soleus noted bilaterally    Functional Strength Testing  Ankle musculature 5/5 bilaterally     Palpation    Left foot:   Acutely tender along the medial distribution of the plantar fascia and at its calcaneal attachment.    Right foot:  Moderately tender along the medial distribution of the plantar fascia and its calcaneal  attachment.      Neuromuscular Exam    Gait  Antalgic with weight bearing on the left foot.    Transitional Movements  Sit to stand painful with weight bearing on the left foot    Balance and Coordination  No balance or coordination difficulties observed    Sensation  No sensory disturbances noted throughout the LE's  Patient did not complain of paresthesias.    PROBLEM LIST - ASSESSMENT  Patient presents with acute tenderness of the left plantar fascia which is inhibiting ambulation, ADL's and exercise activities due to pain with weight bearing.  She has tolerated the stretching exercises given to her by the podiatrist poorly.  Recommend modifying the stretches, and begin MFR of the plantar fascia which she reports tolerating well.    Activity Limitations, Participation Restrictions, and CO-MORBIDITIES which may impact the plan of care and potentially impede the patient's progress in therapy include:  none    The patient does not present with any learning or communication barriers which may impact the plan of care and potentially impede the patient's progress in therapy.      CLINICAL PRESENTATION:  Patient's Clinical Presentation is STABLE.        RECOMMENDED PLAN OF CARE:  Gastrocs/soleus stretching, NWB without combined toe extension.  Tool assisted MFR of the plantar fascia  Joint mobilization of the ankle and forefoot    TREATMENT PROVIDED:     -moist heat applied to the left plantar fascia x 20 mins  -tool assisted MFR of the plantar fascia bilaterally   -joint mobilization of the hindfoot and forefoot of the ankle bilaterally  -passive stretching of the plantar fascia bilaterally  (manual therapy x 20 mins)      PLAN:   Continue therapy 2 x week.      SHORT TERM GOALS:    1. Patient will report a decrease in foot pain of 2 levels or greater - goal achieved  2. Patient will report compliance with daily stretches - goal achieved  3. Acquire approval to treat complaints of right plantar fascia tenderness  through signed Plan of Care - goal achieved     LONG TERM GOALS:  1. Resolution of plantar fascia tenderness  2. Patient to return to treadmill walking for exercise  3. Patient will report a 20% or greater decrease in functional impairment on the LEFS      These services are reasonable and necessary for the conditions set forth above while under my care.   '

## 2018-02-08 ENCOUNTER — TELEPHONE (OUTPATIENT)
Dept: CARDIOLOGY | Facility: CLINIC | Age: 78
End: 2018-02-08

## 2018-02-08 NOTE — TELEPHONE ENCOUNTER
----- Message from Charmaine Real MA sent at 1/25/2018  4:02 PM CST -----  Regarding: call pt   Call pt to check and see how new meds are doing

## 2018-02-08 NOTE — TELEPHONE ENCOUNTER
Spoke with pt.  She said her BP coming down and was in the 150's over 90.  She hadn't taken her meds for two days.  I told her to make sure she took them daily and to call if BP did not come down further.

## 2018-02-09 ENCOUNTER — CLINICAL SUPPORT (OUTPATIENT)
Dept: REHABILITATION | Facility: HOSPITAL | Age: 78
End: 2018-02-09
Attending: PODIATRIST
Payer: MEDICARE

## 2018-02-09 DIAGNOSIS — M79.671 RIGHT FOOT PAIN: ICD-10-CM

## 2018-02-09 DIAGNOSIS — M79.672 LEFT FOOT PAIN: Primary | ICD-10-CM

## 2018-02-09 PROCEDURE — 97140 MANUAL THERAPY 1/> REGIONS: CPT | Mod: PO | Performed by: PHYSICAL THERAPIST

## 2018-02-09 NOTE — PROGRESS NOTES
DATE OF INITIAL PHYSICAL THERAPY EVALUATION:             2018      REFERRING PROVIDER:       Candelaria Le DPM      REFERRING DIAGNOSIS:     Left foot pain   M79.62         Right foot pain M79.61    ORDERS:   Evaluate and treat as indicated; Plan of Care sign by Dr. Le approving treatment 2 x week and to include the right foot.      Orders will  2018    CODING CYCLE VISIT    #4      SUBJECTIVE:  Patient states she has responded well to therapy and is now experiencing mild tenderness in the plantar fascia bilaterally.      Patients primary complaint(s):  Tenderness and pain of the plantar fascia bilaterally; left worse than right  Uncomfortable gait    History of present condition:  (From initial eval)  Patient reports she began to experience tenderness and pain in the arches of both feet in 2017.  The left arch has been significantly more painful than the right.  She denies any trauma or unusual activity prior to onset of symptoms.  Prior to becoming symptomatic she was walking regularly on the treadmill for exercise.  She is no longer able to tolerate the treadmill; but has been able to continue exercising on the elliptical.   She states her pain wakes her during the night.  Pain is located in the plantar fascia bilaterally and described as throbbing in nature.  The first steps in the early AM are particularly painful.   She reports trying to stretch as suggested by her doctor; however she has tolerated these stretches poorly stating they are painful.  Stretch demonstrated to be ankle dorsiflexion combined with toe extension, and knee fully extended.    Pain Ratin/10  Left plantar fascia      1/10  Right plantar fasica    Patient reports the following functional activity limitations:  Gait impaired due to arch pain with weight bearing.    (FUNCTIONAL ASSESSMENT TOOL)  LOWER EXTREMITY FUNCTIONAL SCALE    Patient-reported functional assessment scores are rated  as follows:  A score of 0/4 represents extreme difficulty or unable to perform the activity. A score of 1/4 represents quite a bit of difficulty. A score of 2/4 represents moderate difficulty. A score of 3/4 represents a little bit of difficulty. A score of 4/4 represents no difficulty.  Completed on January 26, 2018                EVAL   1. Any of your usual work, housework or school activities   2/4  2. Your usual hobbies, sporting     1/4  3. Getting in and out of tub      3/4  4. Walking between rooms      3/4  5. Putting on shoes or socks      3/4  6. Squatting        4/4  7. Lifting an object from the ground      4/4  8. Performing light activities around the home   2/4  9. Performing heavy activities around the home   2/4  10. Getting in and out of car      4/4  11. Walking 2 blocks       1/4  12.Walking a mile       1/4  13. Getting up and down 1 flight of stairs    2/4  14. Standing for 1 hour      3/4  15. Sitting for an hour       4/4  16. Running on even ground      0/4  17. Running on uneven ground     0/4  18. Making sharp turns when running fast    0/4  19. Hopping        0/4  20. Rolling over in bed       4/4    Patient reports 55% ability based on score of the Lower Extremity Functional Scale.      Functional Limitations and Goal:  This patient's primary Physical Therapy goal is to return to their prior level of function (Mobility G-8978) without limitations.  The patient's current level of impairment is 40 to 59  percent impaired (CK) based on their score of 55 percent impaired on the Lower Extremity Functional Scale.  The patient is expected to achieve a score of 1 to 19 percent impaired ( G-8979  CI ) within 10 treatment days.    Past Medical History and co-morbidities:  Past Medical History:   Diagnosis Date    Hyperlipidemia     Hypertension      Patient Active Problem List   Diagnosis    Essential hypertension    Leg swelling       Current Outpatient Prescriptions:     biotin 10,000 mcg  Cap, Take by mouth., Disp: , Rfl:     cholecalciferol, vitamin D3, (VITAMIN D3) 2,000 unit Cap, Take 1 capsule by mouth once daily., Disp: , Rfl:     CHROMIUM ORAL, Take 1,000 mg by mouth., Disp: , Rfl:     ciclopirox (PENLAC) 8 % Soln, Apply to affected toenails at night time DAILY. On 7th day, file nails down, clean all nails with alcohol and restart application process., Disp: 1 Bottle, Rfl: 10    hydroCHLOROthiazide (HYDRODIURIL) 25 MG tablet, Take 1 tablet (25 mg total) by mouth once daily., Disp: 30 tablet, Rfl: 11    lisinopril 10 MG tablet, Take 1 tablet (10 mg total) by mouth once daily., Disp: 90 tablet, Rfl: 3    melatonin 10 mg Cap, Take by mouth., Disp: , Rfl:     meloxicam (MOBIC) 7.5 MG tablet, Take 1 pill daily for 14 days and then only take as needed for pain, Disp: 30 tablet, Rfl: 0    potassium 99 mg Tab, Take by mouth once., Disp: , Rfl:     simvastatin (ZOCOR) 20 MG tablet, Take 20 mg by mouth every evening., Disp: , Rfl:     TURMERIC ROOT EXTRACT ORAL, Take by mouth., Disp: , Rfl:     zinc gluconate 50 mg tablet, Take 50 mg by mouth once daily., Disp: , Rfl:     Previous physical therapy and treatments:  Patient has not participated in a physical therapy program to date for current referring diagnoses.    Occupational/psychosocial/educational profile:  retired    OBJECTIVE:    Musculoskeletal Exam:  From initial Evaluation    Postural Exam  Left foot is in pes planus    Swelling  No swelling noted in the ankle, foot, or digits bilaterally    ROM  Ankle ROM is WNL's bilaterally    Flexibility  Decreased flexibility of the soleus noted bilaterally    Functional Strength Testing  Ankle musculature 5/5 bilaterally     Palpation    Left foot:   Acutely tender along the medial distribution of the plantar fascia and at its calcaneal attachment.    Right foot:  Moderately tender along the medial distribution of the plantar fascia and its calcaneal attachment.      Neuromuscular  Exam    Gait  Antalgic with weight bearing on the left foot.    Transitional Movements  Sit to stand slightly uncomfortable with weight bearing on the left foot    Balance and Coordination  No balance or coordination difficulties observed    Sensation  No sensory disturbances noted throughout the LE's  Patient did not complain of paresthesias.    PROBLEM LIST - ASSESSMENT  Patient presents with mild tenderness of the left plantar fascia.  Ambulation is much less uncomfortable as well as most ADL's which require weight bearing.  She has tolerated the modified stretching exercises well.  Patient has responded positively to the MFR of the plantar fascia.    Activity Limitations, Participation Restrictions, and CO-MORBIDITIES which may impact the plan of care and potentially impede the patient's progress in therapy include:  none    The patient does not present with any learning or communication barriers which may impact the plan of care and potentially impede the patient's progress in therapy.      CLINICAL PRESENTATION:  Patient's Clinical Presentation is STABLE.        RECOMMENDED PLAN OF CARE:  Gastrocs/soleus stretching, NWB without combined toe extension.  Tool assisted MFR of the plantar fascia  Joint mobilization of the ankle and forefoot    TREATMENT PROVIDED:     -moist heat applied to the left plantar fascia x 20 mins  -tool assisted MFR of the plantar fascia bilaterally   -joint mobilization of the hindfoot and forefoot of the ankle bilaterally  -passive stretching of the plantar fascia bilaterally  (manual therapy x 30 mins provided by therapist)      PLAN:   Continue therapy as indicated to continue the Recommended Plan of Care.      SHORT TERM GOALS:    1. Patient will report a decrease in foot pain of 2 levels or greater - goal achieved  2. Patient will report compliance with daily stretches - goal achieved  3. Acquire approval to treat complaints of right plantar fascia tenderness through signed Plan of  Care - goal achieved     LONG TERM GOALS:  1. Resolution of plantar fascia tenderness  2. Patient to return to treadmill walking for exercise  3. Patient will report a 20% or greater decrease in functional impairment on the LEFS      These services are reasonable and necessary for the conditions set forth above while under my care.   '

## 2018-02-12 ENCOUNTER — TELEPHONE (OUTPATIENT)
Dept: CARDIOLOGY | Facility: CLINIC | Age: 78
End: 2018-02-12

## 2018-02-12 DIAGNOSIS — I10 ESSENTIAL HYPERTENSION: Primary | ICD-10-CM

## 2018-02-12 RX ORDER — LISINOPRIL 20 MG/1
20 TABLET ORAL DAILY
Qty: 90 TABLET | Refills: 3 | Status: SHIPPED | OUTPATIENT
Start: 2018-02-12 | End: 2019-02-12

## 2018-02-12 NOTE — TELEPHONE ENCOUNTER
----- Message from Shayna Rileyite sent at 2/12/2018  3:44 PM CST -----  Contact: Pt   Pt called and stated she needed to speak to the nurse. She stated that her blood pressure is 159/103 and is remaining elevated. She can be reached at [phone].    Thanks,  TF

## 2018-02-12 NOTE — TELEPHONE ENCOUNTER
Pt states her pressure is now 144/90 but fluxuates during the day.  Patient is scheduled for a root canal tomorrow and concerned about the procedure.  Pt also states she has been in tooth pain for two weeks.

## 2018-08-03 RX ORDER — MELOXICAM 7.5 MG/1
TABLET ORAL
Qty: 30 TABLET | Refills: 0 | OUTPATIENT
Start: 2018-08-03

## 2019-01-27 DIAGNOSIS — I10 ESSENTIAL HYPERTENSION: ICD-10-CM

## 2019-01-28 RX ORDER — HYDROCHLOROTHIAZIDE 25 MG/1
TABLET ORAL
Qty: 30 TABLET | Refills: 10 | Status: SHIPPED | OUTPATIENT
Start: 2019-01-28 | End: 2020-08-12

## 2021-02-18 ENCOUNTER — IMMUNIZATION (OUTPATIENT)
Dept: INTERNAL MEDICINE | Facility: CLINIC | Age: 81
End: 2021-02-18
Payer: MEDICARE

## 2021-02-18 DIAGNOSIS — Z23 NEED FOR VACCINATION: Primary | ICD-10-CM

## 2021-02-18 PROCEDURE — 91300 COVID-19, MRNA, LNP-S, PF, 30 MCG/0.3 ML DOSE VACCINE: CPT | Mod: PBBFAC | Performed by: FAMILY MEDICINE

## 2021-03-11 ENCOUNTER — IMMUNIZATION (OUTPATIENT)
Dept: INTERNAL MEDICINE | Facility: CLINIC | Age: 81
End: 2021-03-11
Payer: MEDICARE

## 2021-03-11 DIAGNOSIS — Z23 NEED FOR VACCINATION: Primary | ICD-10-CM

## 2021-03-11 PROCEDURE — 91300 COVID-19, MRNA, LNP-S, PF, 30 MCG/0.3 ML DOSE VACCINE: CPT | Mod: PBBFAC | Performed by: FAMILY MEDICINE

## 2021-03-11 PROCEDURE — 0002A COVID-19, MRNA, LNP-S, PF, 30 MCG/0.3 ML DOSE VACCINE: CPT | Mod: PBBFAC | Performed by: FAMILY MEDICINE
